# Patient Record
Sex: MALE | Race: WHITE | Employment: PART TIME | URBAN - METROPOLITAN AREA
[De-identification: names, ages, dates, MRNs, and addresses within clinical notes are randomized per-mention and may not be internally consistent; named-entity substitution may affect disease eponyms.]

---

## 2018-06-26 ENCOUNTER — APPOINTMENT (OUTPATIENT)
Dept: GENERAL RADIOLOGY | Facility: HOSPITAL | Age: 68
DRG: 309 | End: 2018-06-26
Attending: EMERGENCY MEDICINE
Payer: MEDICARE

## 2018-06-26 ENCOUNTER — HOSPITAL ENCOUNTER (INPATIENT)
Facility: HOSPITAL | Age: 68
LOS: 2 days | Discharge: HOME | DRG: 309 | End: 2018-06-28
Attending: EMERGENCY MEDICINE | Admitting: INTERNAL MEDICINE
Payer: MEDICARE

## 2018-06-26 ENCOUNTER — APPOINTMENT (OUTPATIENT)
Dept: CV DIAGNOSTICS | Facility: HOSPITAL | Age: 68
DRG: 309 | End: 2018-06-26
Attending: EMERGENCY MEDICINE
Payer: MEDICARE

## 2018-06-26 ENCOUNTER — APPOINTMENT (OUTPATIENT)
Dept: CT IMAGING | Facility: HOSPITAL | Age: 68
DRG: 309 | End: 2018-06-26
Attending: EMERGENCY MEDICINE
Payer: MEDICARE

## 2018-06-26 DIAGNOSIS — I48.91 ATRIAL FIBRILLATION WITH RAPID VENTRICULAR RESPONSE (HCC): Primary | ICD-10-CM

## 2018-06-26 DIAGNOSIS — I31.3 PERICARDIAL EFFUSION: ICD-10-CM

## 2018-06-26 PROBLEM — R73.9 HYPERGLYCEMIA: Status: ACTIVE | Noted: 2018-06-26

## 2018-06-26 PROBLEM — I31.39 PERICARDIAL EFFUSION: Status: ACTIVE | Noted: 2018-06-26

## 2018-06-26 LAB
ANION GAP SERPL CALC-SCNC: 10 MMOL/L (ref 0–18)
BACTERIA UR QL AUTO: NEGATIVE /HPF
BASOPHILS # BLD: 0 K/UL (ref 0–0.2)
BASOPHILS NFR BLD: 1 %
BILIRUB UR QL: NEGATIVE
BUN SERPL-MCNC: 15 MG/DL (ref 8–20)
BUN/CREAT SERPL: 13.2 (ref 10–20)
CALCIUM SERPL-MCNC: 9.4 MG/DL (ref 8.5–10.5)
CHLORIDE SERPL-SCNC: 100 MMOL/L (ref 95–110)
CLARITY UR: CLEAR
CO2 SERPL-SCNC: 28 MMOL/L (ref 22–32)
COLOR UR: YELLOW
CREAT SERPL-MCNC: 1.14 MG/DL (ref 0.5–1.5)
EOSINOPHIL # BLD: 0.1 K/UL (ref 0–0.7)
EOSINOPHIL NFR BLD: 1 %
ERYTHROCYTE [DISTWIDTH] IN BLOOD BY AUTOMATED COUNT: 13.6 % (ref 11–15)
ERYTHROCYTE [SEDIMENTATION RATE] IN BLOOD: 8 MM/HR (ref 0–20)
GLUCOSE BLDC GLUCOMTR-MCNC: 247 MG/DL (ref 70–99)
GLUCOSE SERPL-MCNC: 247 MG/DL (ref 70–99)
GLUCOSE UR-MCNC: >=500 MG/DL
HCT VFR BLD AUTO: 50.8 % (ref 41–52)
HGB BLD-MCNC: 17.6 G/DL (ref 13.5–17.5)
HGB UR QL STRIP.AUTO: NEGATIVE
HYALINE CASTS #/AREA URNS AUTO: 1 /LPF
KETONES UR-MCNC: NEGATIVE MG/DL
LACTATE SERPL-SCNC: 2 MMOL/L (ref 0.5–2.2)
LEUKOCYTE ESTERASE UR QL STRIP.AUTO: NEGATIVE
LYMPHOCYTES # BLD: 1 K/UL (ref 1–4)
LYMPHOCYTES NFR BLD: 10 %
MCH RBC QN AUTO: 32 PG (ref 27–32)
MCHC RBC AUTO-ENTMCNC: 34.6 G/DL (ref 32–37)
MCV RBC AUTO: 92.6 FL (ref 80–100)
MONOCYTES # BLD: 1 K/UL (ref 0–1)
MONOCYTES NFR BLD: 11 %
NEUTROPHILS # BLD AUTO: 7.2 K/UL (ref 1.8–7.7)
NEUTROPHILS NFR BLD: 78 %
NITRITE UR QL STRIP.AUTO: NEGATIVE
OSMOLALITY UR CALC.SUM OF ELEC: 295 MOSM/KG (ref 275–295)
PH UR: 5 [PH] (ref 5–8)
PLATELET # BLD AUTO: 206 K/UL (ref 140–400)
PMV BLD AUTO: 10.2 FL (ref 7.4–10.3)
POTASSIUM SERPL-SCNC: 3.5 MMOL/L (ref 3.3–5.1)
PROT UR-MCNC: NEGATIVE MG/DL
RBC # BLD AUTO: 5.49 M/UL (ref 4.5–5.9)
RBC #/AREA URNS AUTO: 2 /HPF
SODIUM SERPL-SCNC: 138 MMOL/L (ref 136–144)
SP GR UR STRIP: 1.01 (ref 1–1.03)
TROPONIN I SERPL-MCNC: 0.01 NG/ML (ref ?–0.03)
TROPONIN I SERPL-MCNC: 0.02 NG/ML (ref ?–0.03)
TROPONIN I SERPL-MCNC: 0.02 NG/ML (ref ?–0.03)
UROBILINOGEN UR STRIP-ACNC: <2
VIT C UR-MCNC: NEGATIVE MG/DL
WBC # BLD AUTO: 9.3 K/UL (ref 4–11)
WBC #/AREA URNS AUTO: 1 /HPF

## 2018-06-26 PROCEDURE — 36415 COLL VENOUS BLD VENIPUNCTURE: CPT

## 2018-06-26 PROCEDURE — 83605 ASSAY OF LACTIC ACID: CPT | Performed by: EMERGENCY MEDICINE

## 2018-06-26 PROCEDURE — 93005 ELECTROCARDIOGRAM TRACING: CPT

## 2018-06-26 PROCEDURE — 71045 X-RAY EXAM CHEST 1 VIEW: CPT | Performed by: EMERGENCY MEDICINE

## 2018-06-26 PROCEDURE — 96365 THER/PROPH/DIAG IV INF INIT: CPT

## 2018-06-26 PROCEDURE — 82962 GLUCOSE BLOOD TEST: CPT

## 2018-06-26 PROCEDURE — 87040 BLOOD CULTURE FOR BACTERIA: CPT | Performed by: EMERGENCY MEDICINE

## 2018-06-26 PROCEDURE — 96366 THER/PROPH/DIAG IV INF ADDON: CPT

## 2018-06-26 PROCEDURE — 71260 CT THORAX DX C+: CPT | Performed by: EMERGENCY MEDICINE

## 2018-06-26 PROCEDURE — 99285 EMERGENCY DEPT VISIT HI MDM: CPT

## 2018-06-26 PROCEDURE — 96375 TX/PRO/DX INJ NEW DRUG ADDON: CPT

## 2018-06-26 PROCEDURE — 85652 RBC SED RATE AUTOMATED: CPT | Performed by: INTERNAL MEDICINE

## 2018-06-26 PROCEDURE — 93306 TTE W/DOPPLER COMPLETE: CPT | Performed by: EMERGENCY MEDICINE

## 2018-06-26 PROCEDURE — 84484 ASSAY OF TROPONIN QUANT: CPT | Performed by: INTERNAL MEDICINE

## 2018-06-26 PROCEDURE — 84484 ASSAY OF TROPONIN QUANT: CPT | Performed by: EMERGENCY MEDICINE

## 2018-06-26 PROCEDURE — 81001 URINALYSIS AUTO W/SCOPE: CPT | Performed by: EMERGENCY MEDICINE

## 2018-06-26 PROCEDURE — 93010 ELECTROCARDIOGRAM REPORT: CPT | Performed by: EMERGENCY MEDICINE

## 2018-06-26 PROCEDURE — 85025 COMPLETE CBC W/AUTO DIFF WBC: CPT | Performed by: EMERGENCY MEDICINE

## 2018-06-26 PROCEDURE — 80048 BASIC METABOLIC PNL TOTAL CA: CPT | Performed by: EMERGENCY MEDICINE

## 2018-06-26 RX ORDER — HEPARIN SODIUM 5000 [USP'U]/ML
5000 INJECTION, SOLUTION INTRAVENOUS; SUBCUTANEOUS EVERY 8 HOURS SCHEDULED
Status: DISCONTINUED | OUTPATIENT
Start: 2018-06-26 | End: 2018-06-27

## 2018-06-26 RX ORDER — METOPROLOL TARTRATE 5 MG/5ML
5 INJECTION INTRAVENOUS ONCE
Status: COMPLETED | OUTPATIENT
Start: 2018-06-26 | End: 2018-06-26

## 2018-06-26 RX ORDER — ASPIRIN 81 MG/1
324 TABLET, CHEWABLE ORAL ONCE
Status: COMPLETED | OUTPATIENT
Start: 2018-06-26 | End: 2018-06-26

## 2018-06-26 RX ORDER — ALPRAZOLAM 0.5 MG/1
0.5 TABLET ORAL DAILY PRN
Status: DISCONTINUED | OUTPATIENT
Start: 2018-06-26 | End: 2018-06-28

## 2018-06-26 RX ORDER — ASPIRIN 81 MG/1
81 TABLET, CHEWABLE ORAL DAILY
Status: DISCONTINUED | OUTPATIENT
Start: 2018-06-26 | End: 2018-06-28

## 2018-06-26 RX ORDER — ALPRAZOLAM 0.5 MG/1
0.5 TABLET ORAL DAILY PRN
COMMUNITY

## 2018-06-26 RX ORDER — DILTIAZEM HYDROCHLORIDE 5 MG/ML
10 INJECTION INTRAVENOUS ONCE
Status: COMPLETED | OUTPATIENT
Start: 2018-06-26 | End: 2018-06-26

## 2018-06-26 RX ORDER — ACETAMINOPHEN 325 MG/1
650 TABLET ORAL EVERY 6 HOURS PRN
Status: DISCONTINUED | OUTPATIENT
Start: 2018-06-26 | End: 2018-06-28

## 2018-06-26 RX ORDER — ZOLPIDEM TARTRATE 5 MG/1
5 TABLET ORAL NIGHTLY PRN
Status: DISCONTINUED | OUTPATIENT
Start: 2018-06-26 | End: 2018-06-28

## 2018-06-26 RX ORDER — ZOLPIDEM TARTRATE 5 MG/1
5 TABLET ORAL NIGHTLY PRN
COMMUNITY

## 2018-06-26 RX ORDER — METOPROLOL TARTRATE 50 MG/1
50 TABLET, FILM COATED ORAL 2 TIMES DAILY
COMMUNITY

## 2018-06-26 NOTE — PROGRESS NOTES
Chart reviewed. Case discussed with ER attending  Full consult to follow. Patient came with palpitations and cp and fever to 102. He has h/o paroxysmal afib also. Here afib with rvr now better. EKG shows LBBB, first EKG was regular ?  Flutter then seco

## 2018-06-26 NOTE — ED INITIAL ASSESSMENT (HPI)
Pt to ED via EMS from MD office c/o palpitations, diaphoresis, and back pain. Pt with afib w/ RVR at MD office.

## 2018-06-26 NOTE — ED PROVIDER NOTES
Patient Seen in: [facility] AND [facility] Emergency Department    History   Patient presents with:  Arrythmia/Palpitations (cardiovascular)    Stated Complaint:     HPI    70 yo male sent from primary care physicians office for rapid heart rate.  The patient has Pulmonary/Chest: Effort normal and breath sounds normal.   Decreased bibasilar breath sounds   Abdominal: Soft. Bowel sounds are normal. He exhibits no distension and no mass. There is tenderness (mild diffuse). There is no rebound and no guarding.    Wilhelminia Chum disposition: 6/26/2018  6:00 PM       labs and imaging reviewed. Pericardial effusion noted on CT. Stat 2 d echo ordered and completed in the ED. Read by Dr Reji Houston, small effusion is present.  will give aspirin in ED but will hold on full anticoagulation

## 2018-06-27 ENCOUNTER — APPOINTMENT (OUTPATIENT)
Dept: NUCLEAR MEDICINE | Facility: HOSPITAL | Age: 68
DRG: 309 | End: 2018-06-27
Attending: INTERNAL MEDICINE
Payer: MEDICARE

## 2018-06-27 ENCOUNTER — APPOINTMENT (OUTPATIENT)
Dept: CV DIAGNOSTICS | Facility: HOSPITAL | Age: 68
DRG: 309 | End: 2018-06-27
Attending: INTERNAL MEDICINE
Payer: MEDICARE

## 2018-06-27 LAB
GLUCOSE BLDC GLUCOMTR-MCNC: 162 MG/DL (ref 70–99)
GLUCOSE BLDC GLUCOMTR-MCNC: 192 MG/DL (ref 70–99)
GLUCOSE BLDC GLUCOMTR-MCNC: 205 MG/DL (ref 70–99)
GLUCOSE BLDC GLUCOMTR-MCNC: 214 MG/DL (ref 70–99)
HBA1C MFR BLD: 8.8 % (ref 4–6)
POTASSIUM SERPL-SCNC: 3.8 MMOL/L (ref 3.3–5.1)

## 2018-06-27 PROCEDURE — 93018 CV STRESS TEST I&R ONLY: CPT | Performed by: INTERNAL MEDICINE

## 2018-06-27 PROCEDURE — 93016 CV STRESS TEST SUPVJ ONLY: CPT | Performed by: INTERNAL MEDICINE

## 2018-06-27 PROCEDURE — 93017 CV STRESS TEST TRACING ONLY: CPT | Performed by: INTERNAL MEDICINE

## 2018-06-27 PROCEDURE — 93308 TTE F-UP OR LMTD: CPT | Performed by: INTERNAL MEDICINE

## 2018-06-27 PROCEDURE — 82962 GLUCOSE BLOOD TEST: CPT

## 2018-06-27 PROCEDURE — 84132 ASSAY OF SERUM POTASSIUM: CPT | Performed by: INTERNAL MEDICINE

## 2018-06-27 PROCEDURE — 83036 HEMOGLOBIN GLYCOSYLATED A1C: CPT | Performed by: INTERNAL MEDICINE

## 2018-06-27 PROCEDURE — A4216 STERILE WATER/SALINE, 10 ML: HCPCS

## 2018-06-27 PROCEDURE — 78452 HT MUSCLE IMAGE SPECT MULT: CPT | Performed by: INTERNAL MEDICINE

## 2018-06-27 RX ORDER — KETOTIFEN FUMARATE 0.35 MG/ML
1 SOLUTION/ DROPS OPHTHALMIC 2 TIMES DAILY
Status: DISCONTINUED | OUTPATIENT
Start: 2018-06-27 | End: 2018-06-28

## 2018-06-27 RX ORDER — METOPROLOL TARTRATE 50 MG/1
50 TABLET, FILM COATED ORAL
Status: DISCONTINUED | OUTPATIENT
Start: 2018-06-27 | End: 2018-06-28

## 2018-06-27 RX ORDER — FLECAINIDE ACETATE 100 MG/1
100 TABLET ORAL EVERY 12 HOURS SCHEDULED
Status: DISCONTINUED | OUTPATIENT
Start: 2018-06-27 | End: 2018-06-28

## 2018-06-27 RX ORDER — OMEPRAZOLE 20 MG/1
40 CAPSULE, DELAYED RELEASE ORAL EVERY MORNING
COMMUNITY

## 2018-06-27 RX ORDER — 0.9 % SODIUM CHLORIDE 0.9 %
VIAL (ML) INJECTION
Status: COMPLETED
Start: 2018-06-27 | End: 2018-06-27

## 2018-06-27 RX ORDER — FAMOTIDINE 20 MG/1
20 TABLET ORAL NIGHTLY
Status: DISCONTINUED | OUTPATIENT
Start: 2018-06-27 | End: 2018-06-28

## 2018-06-27 RX ORDER — DICYCLOMINE HYDROCHLORIDE 10 MG/1
10 CAPSULE ORAL 3 TIMES DAILY PRN
COMMUNITY

## 2018-06-27 RX ORDER — POTASSIUM CHLORIDE 20 MEQ/1
40 TABLET, EXTENDED RELEASE ORAL ONCE
Status: COMPLETED | OUTPATIENT
Start: 2018-06-27 | End: 2018-06-27

## 2018-06-27 RX ORDER — OLOPATADINE HYDROCHLORIDE 2 MG/ML
1 SOLUTION/ DROPS OPHTHALMIC 2 TIMES DAILY
Status: DISCONTINUED | OUTPATIENT
Start: 2018-06-27 | End: 2018-06-27

## 2018-06-27 RX ORDER — DICYCLOMINE HYDROCHLORIDE 10 MG/1
10 CAPSULE ORAL 3 TIMES DAILY PRN
Status: DISCONTINUED | OUTPATIENT
Start: 2018-06-27 | End: 2018-06-28

## 2018-06-27 RX ORDER — GLIPIZIDE 10 MG/1
10 TABLET ORAL
COMMUNITY

## 2018-06-27 RX ORDER — FLUTICASONE PROPIONATE 50 MCG
1 SPRAY, SUSPENSION (ML) NASAL DAILY
Status: DISCONTINUED | OUTPATIENT
Start: 2018-06-27 | End: 2018-06-28

## 2018-06-27 RX ORDER — PANTOPRAZOLE SODIUM 40 MG/1
40 TABLET, DELAYED RELEASE ORAL
Status: DISCONTINUED | OUTPATIENT
Start: 2018-06-27 | End: 2018-06-28

## 2018-06-27 RX ORDER — FAMOTIDINE 20 MG/1
20 TABLET ORAL NIGHTLY
COMMUNITY

## 2018-06-27 RX ORDER — LOSARTAN POTASSIUM AND HYDROCHLOROTHIAZIDE 12.5; 5 MG/1; MG/1
1 TABLET ORAL DAILY
COMMUNITY

## 2018-06-27 RX ORDER — GLIPIZIDE 10 MG/1
10 TABLET ORAL
Status: DISCONTINUED | OUTPATIENT
Start: 2018-06-27 | End: 2018-06-28

## 2018-06-27 RX ORDER — DEXTROSE MONOHYDRATE 25 G/50ML
50 INJECTION, SOLUTION INTRAVENOUS AS NEEDED
Status: DISCONTINUED | OUTPATIENT
Start: 2018-06-27 | End: 2018-06-28

## 2018-06-27 RX ORDER — FLUTICASONE PROPIONATE 50 MCG
SPRAY, SUSPENSION (ML) NASAL DAILY
COMMUNITY

## 2018-06-27 NOTE — H&P
[facility]    History and Physical    Marlea Nipple Patient Status:  Inpatient    1950 MRN [id number]   Location [facility] 3W/SW Attending Haydee Medina MD   Hosp Day # 0 PCP Neha Bosch     Date:  2018  Date of Adm %.    Nursing note and vitals reviewed. Constitutional: He is oriented to person, place, and time. He appears well-developed and well-nourished. No distress. HENT:   Head: Normocephalic.    Eyes: Conjunctivae are normal. Pupils are equal, round, and kylah Atrial fibrillation with rapid ventricular response (HCC)  Admit/ iv cardizem drip/ cardiology to see/ no anticoag for now/ asa        Pericardial effusion  Monitor/ cardiology to follow  Fever  ?  Etiology/ id consult  Dm  Restart meds/ sliding scale  htn

## 2018-06-27 NOTE — CONSULTS
INFECTIOUS DISEASE CONSULT NOTE    Marlea Nipple Patient Status:  Inpatient    1950 MRN [id number]   Location [facility] 3W/SW Attending Haydee Medina MD   Hosp Day # 1 PCP TOM SILVESTRE Comment:REDNESS    Medications:    Current Facility-Administered Medications:   •  dextrose 50 % injection 50 mL, 50 mL, Intravenous, PRN  •  Glucose-Vitamin C (DEX-4) 4-0.006 g chewable tab 4 tablet, 4 tablet, Oral, Q15 Min PRN  •  glucose (DEX4) oral liq to auscultation bilaterally. No wheezes. No rhonchi. Cardiovascular: tachycardic, irregular  Abdomen: Soft, nontender, nondistended. Musculoskeletal: No edema noted; +DP pulses  Integument: No lesions. No erythema.     Laboratory Data:    Recent Labs   L micro, imaging report  - discussed with patient, RN,  Manual Courser    Thank you for allowing me to participate in the care of this patient. Please do not hesitate to call if you have any questions.    I will continue to follow with you and will make further rec

## 2018-06-27 NOTE — PROGRESS NOTES
[facility]    Progress Note    Ilana Fraser Patient Status:  Inpatient    1950 MRN [id number]   Location [facility] 3W/SW Attending Viri Jarvis MD   Hosp Day # 1 PCP Deena Mcdaniels     Subjective:   Subjective:  Better/ Moderate-sized circumferential pericardial effusion measuring 18 mm in diameter. Correlate with cardiac echo. 2.  No acute pulmonary embolus to the segmental pulmonary artery level. No acute aortic injury; no dissection.   Findings discussed with Dr. Bladimir Brown

## 2018-06-27 NOTE — CONSULTS
Patient is a 69year old male who was admitted to the hospital for Atrial fibrillation with rapid ventricular response ([address] came in with cp, no palp no syncope. Had 102 fever, cough, sweating, weakness. No palp, no syncope. No cp or sob on exertion.  Does Zolpidem Tartrate 5 MG Oral Tab Take 5 mg by mouth nightly as needed for Sleep.        Allergies:    Golytely [Peg 3350-*    FACE FLUSHING, OTHER (SEE COMMENTS)    Comment:REDNESS    Review of Systems:   A 10 point review of system was performed and negat during stress test.  4. Pericardial effusion r/o pericarditis. No tamponade. 5. Possible LV dysfunction not well seen due to tachy and dyssynchrony due to lbbb. Repeat echo today. PLAN:  MPI stress test.  Repeat echo.   Start antiarrhythmic for preventi

## 2018-06-27 NOTE — PLAN OF CARE
Problem: Patient Centered Care  Goal: Patient preferences are identified and integrated in the patient's plan of care  Interventions:  - What would you like us to know as we care for you?  Patient lives alone  - Provide timely, complete, and accurate inform stability  - Monitor arterial and/or venous puncture sites for bleeding and/or hematoma  - Assess quality of pulses, skin color and temperature  - Assess for signs of decreased coronary artery perfusion - ex.  Angina  - Evaluate fluid balance, assess for ed

## 2018-06-27 NOTE — PROGRESS NOTES
[facility] Pharmacy Note:  Therapeutic Interchange    Luther Pang was previously taking olopatadine (Pataday) 0.2 % in both eyes q12h at home prior to admission.      Per therapeutic interchange, the patient will be switched to ketotifen (Zaditor) 0.025 % in bot

## 2018-06-27 NOTE — CM/SW NOTE
Explanation of of BPCI/Medicare program provided. Patient was enrolled under . Patient/family agreed to phone f/u for 3 months from [address] after discharge from [address] [place] [address]. BPCI/Medicare letter and brochure provided.   Manda Venegas RN, CTL

## 2018-06-28 VITALS
BODY MASS INDEX: 27.9 KG/M2 | DIASTOLIC BLOOD PRESSURE: 76 MMHG | SYSTOLIC BLOOD PRESSURE: 124 MMHG | HEART RATE: 74 BPM | HEIGHT: 72 IN | TEMPERATURE: 98 F | RESPIRATION RATE: 18 BRPM | WEIGHT: 206 LBS | OXYGEN SATURATION: 97 %

## 2018-06-28 LAB
GLUCOSE BLDC GLUCOMTR-MCNC: 101 MG/DL (ref 70–99)
GLUCOSE BLDC GLUCOMTR-MCNC: 226 MG/DL (ref 70–99)
POTASSIUM SERPL-SCNC: 3.6 MMOL/L (ref 3.3–5.1)

## 2018-06-28 PROCEDURE — 84132 ASSAY OF SERUM POTASSIUM: CPT | Performed by: INTERNAL MEDICINE

## 2018-06-28 PROCEDURE — 82962 GLUCOSE BLOOD TEST: CPT

## 2018-06-28 RX ORDER — FLECAINIDE ACETATE 100 MG/1
100 TABLET ORAL EVERY 12 HOURS SCHEDULED
Qty: 60 TABLET | Refills: 1 | Status: SHIPPED | OUTPATIENT
Start: 2018-06-28 | End: 2018-07-26

## 2018-06-28 RX ORDER — ASPIRIN 81 MG/1
81 TABLET, CHEWABLE ORAL DAILY
Qty: 30 TABLET | Refills: 0 | Status: SHIPPED | OUTPATIENT
Start: 2018-06-29 | End: 2019-05-02

## 2018-06-28 RX ORDER — POTASSIUM CHLORIDE 20 MEQ/1
40 TABLET, EXTENDED RELEASE ORAL EVERY 4 HOURS
Status: COMPLETED | OUTPATIENT
Start: 2018-06-28 | End: 2018-06-28

## 2018-06-28 NOTE — PLAN OF CARE
Problem: Patient Centered Care  Goal: Patient preferences are identified and integrated in the patient's plan of care  Interventions:  - What would you like us to know as we care for you?  Patient lives alone  - Provide timely, complete, and accurate inform bleeding and/or hematoma  - Assess quality of pulses, skin color and temperature  - Assess for signs of decreased coronary artery perfusion - ex.  Angina  - Evaluate fluid balance, assess for edema, trend weights   Outcome: Progressing    Goal: Absence of c

## 2018-06-28 NOTE — DISCHARGE SUMMARY
[facility]  Discharge Summary    Sandoval Furlong Patient Status:  Inpatient    1950 MRN [id number]   Location [facility] 3W/SW Attending Hiwot John MD   Hosp Day # 2 PCP Radha Farris     Date of Admission: 2018    Date of Dis MG Oral Tab  Take 10 mg by mouth 2 (two) times daily before meals. SITagliptin Phosphate 50 MG Oral Tab  Take 50 mg by mouth daily. Losartan Potassium-HCTZ 50-12.5 MG Oral Tab  Take 1 tablet by mouth daily.     omeprazole 20 MG Oral Capsule Delayed Re

## 2018-06-28 NOTE — PROGRESS NOTES
Patient seen in follow up. Patient denies any chest pain or sob. Remains sinus.    06/28/18  0809   BP: 124/76   Pulse: 74   Resp: 18   Temp: 98.4 °F (36.9 °C)       Intake/Output Summary (Last 24 hours) at 06/28/18 1100  Last data filed at 06/28/18 1 acetaminophen (TYLENOL) tab 650 mg 650 mg Oral Q6H PRN   ALPRAZolam (XANAX) tab 0.5 mg 0.5 mg Oral Daily PRN   Zolpidem Tartrate (AMBIEN) tab 5 mg 5 mg Oral Nightly PRN       Prescriptions Prior to Admission:  glipiZIDE 10 MG Oral Tab Take 10 mg by mouth 2 5. Possible LV dysfunction not well seen due to tachy and dyssynchrony due to lbbb. Repeat echo shows LV function normal.     PLAN:  Home on current meds. Start Flecainide.     Hugo Chase  Phone: [phone]  Jackie Chavarria

## 2018-06-29 ENCOUNTER — TELEPHONE (OUTPATIENT)
Dept: CARDIOLOGY UNIT | Facility: HOSPITAL | Age: 68
End: 2018-06-29

## 2018-06-29 PROCEDURE — 84439 ASSAY OF FREE THYROXINE: CPT | Performed by: INTERNAL MEDICINE

## 2018-06-29 PROCEDURE — 80053 COMPREHEN METABOLIC PANEL: CPT | Performed by: INTERNAL MEDICINE

## 2018-06-29 PROCEDURE — 84481 FREE ASSAY (FT-3): CPT | Performed by: INTERNAL MEDICINE

## 2018-06-29 PROCEDURE — 84443 ASSAY THYROID STIM HORMONE: CPT | Performed by: INTERNAL MEDICINE

## 2020-05-11 NOTE — IMAGING NOTE
Contacted patient. History taken hx as follow .  PT ON METOPROLOL 50 MG BID  PT DENIES S/S OR EXPOSURE TO COVID, AWARE SCREENING ON ARRIVAL      Procedure explained questions answered     PARK IN GREEN LOT, ENTER THRU EAST  ENTRANCE TURN LEFT     CHECK IN T

## 2020-05-14 ENCOUNTER — HOSPITAL ENCOUNTER (OUTPATIENT)
Dept: CT IMAGING | Facility: HOSPITAL | Age: 70
Discharge: HOME | End: 2020-05-14
Attending: INTERNAL MEDICINE
Payer: MEDICARE

## 2020-05-14 VITALS
SYSTOLIC BLOOD PRESSURE: 140 MMHG | HEART RATE: 72 BPM | OXYGEN SATURATION: 97 % | HEIGHT: 72 IN | BODY MASS INDEX: 30.75 KG/M2 | DIASTOLIC BLOOD PRESSURE: 69 MMHG | RESPIRATION RATE: 16 BRPM | WEIGHT: 227 LBS

## 2020-05-14 DIAGNOSIS — R07.9 CHEST PAIN, UNSPECIFIED: ICD-10-CM

## 2020-05-14 PROCEDURE — 82565 ASSAY OF CREATININE: CPT

## 2020-05-14 PROCEDURE — 75574 CT ANGIO HRT W/3D IMAGE: CPT | Performed by: INTERNAL MEDICINE

## 2020-05-14 RX ORDER — METOPROLOL TARTRATE 5 MG/5ML
5 INJECTION INTRAVENOUS SEE ADMIN INSTRUCTIONS
Status: DISCONTINUED | OUTPATIENT
Start: 2020-05-14 | End: 2020-05-16

## 2020-05-14 RX ORDER — DILTIAZEM HYDROCHLORIDE 5 MG/ML
INJECTION INTRAVENOUS
Status: DISCONTINUED
Start: 2020-05-14 | End: 2020-05-14

## 2020-05-14 RX ORDER — METOPROLOL TARTRATE 5 MG/5ML
INJECTION INTRAVENOUS
Status: DISCONTINUED
Start: 2020-05-14 | End: 2020-05-14

## 2020-05-14 RX ORDER — NITROGLYCERIN 0.4 MG/1
0.4 TABLET SUBLINGUAL ONCE
Status: COMPLETED | OUTPATIENT
Start: 2020-05-14 | End: 2020-05-14

## 2020-05-14 RX ORDER — DILTIAZEM HYDROCHLORIDE 5 MG/ML
5 INJECTION INTRAVENOUS SEE ADMIN INSTRUCTIONS
Status: DISCONTINUED | OUTPATIENT
Start: 2020-05-14 | End: 2020-05-16

## 2020-05-14 RX ORDER — NITROGLYCERIN 0.4 MG/1
TABLET SUBLINGUAL
Status: DISCONTINUED
Start: 2020-05-14 | End: 2020-05-14

## 2020-05-14 RX ADMIN — NITROGLYCERIN 0.4 MG: 0.4 TABLET SUBLINGUAL at 11:32:00

## 2020-05-14 RX ADMIN — DILTIAZEM HYDROCHLORIDE 5 MG: 5 INJECTION INTRAVENOUS at 11:37:00

## 2020-05-14 RX ADMIN — METOPROLOL TARTRATE 5 MG: 5 INJECTION INTRAVENOUS at 10:53:00

## 2020-05-14 RX ADMIN — DILTIAZEM HYDROCHLORIDE 5 MG: 5 INJECTION INTRAVENOUS at 11:18:00

## 2020-05-14 RX ADMIN — DILTIAZEM HYDROCHLORIDE 5 MG: 5 INJECTION INTRAVENOUS at 11:23:00

## 2020-05-14 RX ADMIN — Medication 100 MG: at 09:17:00

## 2020-05-14 RX ADMIN — Medication 50 MG: at 10:05:00

## 2020-05-14 RX ADMIN — METOPROLOL TARTRATE 5 MG: 5 INJECTION INTRAVENOUS at 10:58:00

## 2020-05-14 RX ADMIN — DILTIAZEM HYDROCHLORIDE 5 MG: 5 INJECTION INTRAVENOUS at 11:32:00

## 2020-05-14 RX ADMIN — METOPROLOL TARTRATE 5 MG: 5 INJECTION INTRAVENOUS at 11:08:00

## 2020-05-14 RX ADMIN — METOPROLOL TARTRATE 5 MG: 5 INJECTION INTRAVENOUS at 11:03:00

## 2020-05-14 RX ADMIN — DILTIAZEM HYDROCHLORIDE 5 MG: 5 INJECTION INTRAVENOUS at 11:13:00

## 2020-05-14 NOTE — IMAGING NOTE
0910: PATIENT IN  RAD HOLDING. HX TAKEN : IRREGULAR HEART BEAT. PT CONSENTED AT  0915.     BASELINE VITAL SIGNS (SEE FLOW SHEET)    CTA ORDERED BY Lc Saldivar,     WAS PT GIVEN CTA  PREMEDS: YES (SCHEDULED BETA BLOCKER)    METOPROLOL PO GIVEN  1

## 2022-10-29 NOTE — PLAN OF CARE
Problem: Diabetes/Glucose Control  Goal: Glucose maintained within prescribed range  INTERVENTIONS:  - Monitor Blood Glucose as ordered  - Assess for signs and symptoms of hyperglycemia and hypoglycemia  - Administer ordered medications to maintain glucose replacement therapy as ordered  Outcome: Progressing Attending Only

## 2022-12-23 ENCOUNTER — HOSPITAL ENCOUNTER (OUTPATIENT)
Dept: MRI IMAGING | Facility: HOSPITAL | Age: 72
Discharge: HOME | End: 2022-12-23
Attending: INTERNAL MEDICINE
Payer: MEDICARE

## 2022-12-23 DIAGNOSIS — I50.20 SYSTOLIC CHF (HCC): ICD-10-CM

## 2022-12-23 LAB
CREAT BLD-MCNC: 1 MG/DL
GFR SERPLBLD BASED ON 1.73 SQ M-ARVRAT: 79 ML/MIN/1.73M2 (ref 60–?)

## 2022-12-23 PROCEDURE — 82565 ASSAY OF CREATININE: CPT

## (undated) NOTE — LETTER
[address] [facility]    Consent for Coronary CT Angiography    Your doctor has recommended that you have a Coronary CT Angiography procedure.  Coronary CT Angiography is a diagnostic procedure using computed tomography to scan the patient when taking medication. Allergic reactions to medication can range from very minor to very serious leading to a life threatening situation or even death. Please be sure to communicate any allergy you may have to your caregiver immediately.     The i